# Patient Record
Sex: FEMALE | Race: WHITE | NOT HISPANIC OR LATINO | Employment: OTHER | RURAL
[De-identification: names, ages, dates, MRNs, and addresses within clinical notes are randomized per-mention and may not be internally consistent; named-entity substitution may affect disease eponyms.]

---

## 2019-04-17 ENCOUNTER — OFFICE VISIT (OUTPATIENT)
Dept: OTOLARYNGOLOGY | Facility: CLINIC | Age: 84
End: 2019-04-17

## 2019-04-17 VITALS — HEIGHT: 63 IN | WEIGHT: 99 LBS | TEMPERATURE: 99.1 F | BODY MASS INDEX: 17.54 KG/M2

## 2019-04-17 DIAGNOSIS — J37.0 LARYNGITIS, CHRONIC: ICD-10-CM

## 2019-04-17 DIAGNOSIS — R49.0 VOICE HOARSENESS: Primary | ICD-10-CM

## 2019-04-17 DIAGNOSIS — J30.0 VASOMOTOR RHINITIS: ICD-10-CM

## 2019-04-17 DIAGNOSIS — R43.8 HYPOSMIA: ICD-10-CM

## 2019-04-17 PROCEDURE — 31575 DIAGNOSTIC LARYNGOSCOPY: CPT | Performed by: OTOLARYNGOLOGY

## 2019-04-17 PROCEDURE — 99203 OFFICE O/P NEW LOW 30 MIN: CPT | Performed by: OTOLARYNGOLOGY

## 2019-04-17 RX ORDER — TRAVOPROST 0.004 %
DROPS OPHTHALMIC (EYE)
Refills: 3 | COMMUNITY
Start: 2019-04-08

## 2019-04-17 RX ORDER — OXYBUTYNIN CHLORIDE 10 MG/1
TABLET, EXTENDED RELEASE ORAL DAILY
Refills: 2 | COMMUNITY
Start: 2019-01-16

## 2019-04-17 RX ORDER — FLUOROMETHOLONE 0.1 %
SUSPENSION, DROPS(FINAL DOSAGE FORM)(ML) OPHTHALMIC (EYE)
Refills: 6 | COMMUNITY
Start: 2019-03-18

## 2019-04-17 RX ORDER — MIRTAZAPINE 15 MG/1
TABLET, FILM COATED ORAL
Refills: 1 | COMMUNITY
Start: 2019-03-28

## 2019-04-17 RX ORDER — IPRATROPIUM BROMIDE 42 UG/1
2 SPRAY, METERED NASAL 3 TIMES DAILY
Qty: 15 ML | Refills: 5 | Status: SHIPPED | OUTPATIENT
Start: 2019-04-17 | End: 2019-05-29 | Stop reason: SDUPTHER

## 2019-04-17 RX ORDER — MINOXIDIL 2.5 MG/1
TABLET ORAL
Refills: 2 | COMMUNITY
Start: 2019-02-13

## 2019-04-17 RX ORDER — BRINZOLAMIDE/BRIMONIDINE TARTRATE 10; 2 MG/ML; MG/ML
SUSPENSION/ DROPS OPHTHALMIC
Refills: 3 | COMMUNITY
Start: 2019-01-11

## 2019-04-17 RX ORDER — CETIRIZINE HYDROCHLORIDE 10 MG/1
TABLET ORAL DAILY
Refills: 2 | COMMUNITY
Start: 2019-04-05 | End: 2020-06-25

## 2019-04-17 RX ORDER — GALANTAMINE HYDROBROMIDE 4 MG/1
TABLET, FILM COATED ORAL DAILY
Refills: 11 | COMMUNITY
Start: 2019-04-06

## 2019-04-17 NOTE — PATIENT INSTRUCTIONS

## 2019-04-17 NOTE — PROGRESS NOTES
Subjective   Beverly Morris is a 88 y.o. female.   Decrease sense of smell and swallowing problems  History of Present Illness     Says she is doing better but she has had decreased sense of smell and swallowing problem for a few months he has some postnasal drip has a lump station and throat she denies weight loss fever chills lumps or neck she does have decreased sense of smell she has had some hoarseness of her voice she is not choking on foods no fever says her voice is mainly raspy    The following portions of the patient's history were reviewed and updated as appropriate: allergies, current medications, past family history, past medical history, past social history, past surgical history and problem list.      Beverly Morris reports that she has never smoked. She has never used smokeless tobacco.  Patient is not a tobacco user and has not been counseled for use of tobacco products    Family History   Problem Relation Age of Onset   • Cancer Other          Current Outpatient Medications:   •  cetirizine (zyrTEC) 10 MG tablet, Take  by mouth Daily., Disp: , Rfl: 2  •  fluorometholone (FML) 0.1 % ophthalmic suspension, , Disp: , Rfl: 6  •  galantamine (RAZADYNE) 4 MG tablet, Take  by mouth Daily., Disp: , Rfl: 11  •  minoxidil (LONITEN) 2.5 MG tablet, TK 1 T PO TID, Disp: , Rfl: 2  •  mirtazapine (REMERON) 15 MG tablet, TK 1 T PO QHS, Disp: , Rfl: 1  •  oxybutynin XL (DITROPAN-XL) 10 MG 24 hr tablet, Take  by mouth Daily., Disp: , Rfl: 2  •  SIMBRINZA 1-0.2 % suspension, INSTILL 1 DROP IN OU BID, Disp: , Rfl: 3  •  TRAVATAN Z 0.004 % solution ophthalmic solution, PUT 1 GTT AEY ONCE D BEFORE BEDTIME, Disp: , Rfl: 3  •  ipratropium (ATROVENT) 0.06 % nasal spray, 2 sprays into the nostril(s) as directed by provider 3 (Three) Times a Day., Disp: 15 mL, Rfl: 5    Allergies   Allergen Reactions   • Ambien [Zolpidem Tartrate] Other (See Comments)     Pt. States she went crazy after taking   • Codeine Nausea And Vomiting        Past Medical History:   Diagnosis Date   • Hypertension        Past Surgical History:   Procedure Laterality Date   • KNEE ARTHROPLASTY Bilateral    • WRIST SURGERY         Review of Systems   Constitutional: Positive for appetite change.   HENT: Positive for hearing loss and sore throat.    All other systems reviewed and are negative.    Hoarse voice      Objective   Physical Exam   Constitutional: She appears well-developed.   HENT:   Head: Normocephalic.   Right Ear: External ear normal.   Left Ear: External ear normal.   Nose: Mucosal edema present.       Mouth/Throat: Oropharynx is clear and moist.   Eyes: Conjunctivae are normal.   Neck: Normal range of motion.   Pulmonary/Chest: Effort normal.   Neurological: She is alert.   Skin: Skin is warm.   Psychiatric: She has a normal mood and affect.   Voice is somewhat raspy though understandable      Procedure Note    Pre-operative Diagnosis:   Chief Complaint   Patient presents with   • Difficulty Swallowing   • Loss of taste       Post-operative Diagnosis: same    Anesthesia: topical with xylocaine and neosynephrine    Endoscopy Type:  Flexible Laryngoscopy    Procedure Details:    The patient was placed in the sitting position.  After topical anesthesia and decongestion, the 4 mm laryngoscope was passed.  The , oropharynx, hypopharynx, and larynx were all examined.  Vocal cords were examined during respiration and phonation.  The following findings were noted:    Findings:  prebularynx and PND no mass lesion    Condition:  Stable.  Patient tolerated procedure well.    Complications:  None    Assessment/Plan   Beverly was seen today for difficulty swallowing and loss of taste.    Diagnoses and all orders for this visit:    Voice hoarseness    Laryngitis, chronic    Vasomotor rhinitis    Hyposmia    Other orders  -     ipratropium (ATROVENT) 0.06 % nasal spray; 2 sprays into the nostril(s) as directed by provider 3 (Three) Times a Day.    Sure there is no  tumor mass but she does have signs of present larynx with aging and postnasal drip    Were trying to control the postnasal drip with spray explained use and side effects I told her that unfortunately sometimes loss of sense of smell and taste can be permanent especially at her age we have to recheck    will recheck in the next few weeks the meantime she will use the medications she is to call if any questions or problems   consider speech therapy if not improving

## 2019-05-29 ENCOUNTER — OFFICE VISIT (OUTPATIENT)
Dept: OTOLARYNGOLOGY | Facility: CLINIC | Age: 84
End: 2019-05-29

## 2019-05-29 VITALS
HEIGHT: 63 IN | BODY MASS INDEX: 17.36 KG/M2 | WEIGHT: 98 LBS | OXYGEN SATURATION: 94 % | HEART RATE: 86 BPM | TEMPERATURE: 98.4 F

## 2019-05-29 DIAGNOSIS — R43.2 ABSENCE OF SENSE OF TASTE: ICD-10-CM

## 2019-05-29 DIAGNOSIS — J37.0 LARYNGITIS, CHRONIC: Primary | ICD-10-CM

## 2019-05-29 DIAGNOSIS — J30.0 VASOMOTOR RHINITIS: ICD-10-CM

## 2019-05-29 DIAGNOSIS — R43.8 HYPOSMIA: ICD-10-CM

## 2019-05-29 PROCEDURE — 99213 OFFICE O/P EST LOW 20 MIN: CPT | Performed by: OTOLARYNGOLOGY

## 2019-05-29 RX ORDER — IPRATROPIUM BROMIDE 42 UG/1
2 SPRAY, METERED NASAL 3 TIMES DAILY
Qty: 15 ML | Refills: 5 | Status: SHIPPED | OUTPATIENT
Start: 2019-05-29

## 2019-05-29 NOTE — PROGRESS NOTES
Subjective   Beverly Morris is a 88 y.o. female.   Follow-up throat    History of Present Illness   Still bothered by some decreased sense of taste that she can tell taste and sweets just certain foods like bananas she has trouble distinguishing she feels like her voice is doing well her drainage is better she does not have any fever chills still has use of hearing aids and is working well she has not had any sore throat      The following portions of the patient's history were reviewed and updated as appropriate: allergies, current medications, past family history, past medical history, past social history, past surgical history and problem list.      Current Outpatient Medications:   •  cetirizine (zyrTEC) 10 MG tablet, Take  by mouth Daily., Disp: , Rfl: 2  •  fluorometholone (FML) 0.1 % ophthalmic suspension, , Disp: , Rfl: 6  •  galantamine (RAZADYNE) 4 MG tablet, Take  by mouth Daily., Disp: , Rfl: 11  •  ipratropium (ATROVENT) 0.06 % nasal spray, 2 sprays into the nostril(s) as directed by provider 3 (Three) Times a Day., Disp: 15 mL, Rfl: 5  •  minoxidil (LONITEN) 2.5 MG tablet, TK 1 T PO TID, Disp: , Rfl: 2  •  mirtazapine (REMERON) 15 MG tablet, TK 1 T PO QHS, Disp: , Rfl: 1  •  oxybutynin XL (DITROPAN-XL) 10 MG 24 hr tablet, Take  by mouth Daily., Disp: , Rfl: 2  •  SIMBRINZA 1-0.2 % suspension, INSTILL 1 DROP IN OU BID, Disp: , Rfl: 3  •  TRAVATAN Z 0.004 % solution ophthalmic solution, PUT 1 GTT AEY ONCE D BEFORE BEDTIME, Disp: , Rfl: 3    Allergies   Allergen Reactions   • Ambien [Zolpidem Tartrate] Other (See Comments)     Pt. States she went crazy after taking   • Codeine Nausea And Vomiting             Review of Systems   Constitutional: Negative for fever.   HENT: Positive for hearing loss and postnasal drip. Negative for ear discharge, ear pain, sinus pressure and sinus pain.         Sense of taste decreased with certain taste           Objective   Physical Exam   Constitutional: She appears  well-developed.   HENT:   Head: Normocephalic.   Right Ear: External ear normal.   Left Ear: External ear normal.   Nose: No mucosal edema.       Mouth/Throat: Uvula is midline, oropharynx is clear and moist and mucous membranes are normal.       Eyes: Conjunctivae are normal.   Neck: Normal range of motion.   Pulmonary/Chest: Effort normal.   Neurological: She is alert.   Skin: Skin is warm.   Psychiatric: She has a normal mood and affect.           Assessment/Plan   Beverly was seen today for follow-up.    Diagnoses and all orders for this visit:    Laryngitis, chronic    Vasomotor rhinitis    Hyposmia    Absence of sense of taste    Other orders  -     ipratropium (ATROVENT) 0.06 % nasal spray; 2 sprays into the nostril(s) as directed by provider 3 (Three) Times a Day.    Gave her refills medicine for drainage as needed    She is not interested in speech therapy feels like her voice volume is adequate  I told her that there is a difficult problem to fix sense of taste completely tickly since it all went away after severe illness.  There is no evidence of any glossitis or yeast infection told her there is some chance it may come back up to a year after infection but otherwise there is little we can do  We will see her back in 1 year unless she has a problem I gave her refills her nasal spray drainage

## 2019-05-29 NOTE — PATIENT INSTRUCTIONS

## 2019-10-24 RX ORDER — IPRATROPIUM BROMIDE 42 UG/1
SPRAY, METERED NASAL
Qty: 15 ML | Refills: 0 | OUTPATIENT
Start: 2019-10-24

## 2020-06-25 ENCOUNTER — OFFICE VISIT (OUTPATIENT)
Dept: FAMILY MEDICINE CLINIC | Facility: CLINIC | Age: 85
End: 2020-06-25

## 2020-06-25 VITALS
OXYGEN SATURATION: 96 % | HEART RATE: 85 BPM | WEIGHT: 103 LBS | DIASTOLIC BLOOD PRESSURE: 88 MMHG | HEIGHT: 63 IN | BODY MASS INDEX: 18.25 KG/M2 | TEMPERATURE: 98.5 F | SYSTOLIC BLOOD PRESSURE: 140 MMHG | RESPIRATION RATE: 20 BRPM

## 2020-06-25 DIAGNOSIS — R11.0 NAUSEA: ICD-10-CM

## 2020-06-25 DIAGNOSIS — H65.02 ACUTE SEROUS OTITIS MEDIA OF LEFT EAR, RECURRENCE NOT SPECIFIED: Primary | ICD-10-CM

## 2020-06-25 PROCEDURE — 99203 OFFICE O/P NEW LOW 30 MIN: CPT | Performed by: NURSE PRACTITIONER

## 2020-06-25 RX ORDER — ERYTHROMYCIN 5 MG/G
OINTMENT OPHTHALMIC
COMMUNITY

## 2020-06-25 RX ORDER — HYDROXYZINE PAMOATE 25 MG/1
CAPSULE ORAL
COMMUNITY
End: 2020-06-25

## 2020-06-25 RX ORDER — AMOXICILLIN 500 MG/1
500 CAPSULE ORAL 2 TIMES DAILY
Qty: 20 CAPSULE | Refills: 0 | Status: SHIPPED | OUTPATIENT
Start: 2020-06-25 | End: 2020-07-05

## 2020-06-25 RX ORDER — DONEPEZIL HYDROCHLORIDE 10 MG/1
TABLET, FILM COATED ORAL
COMMUNITY

## 2020-06-25 RX ORDER — LORATADINE 10 MG/1
10 TABLET ORAL DAILY
Qty: 30 TABLET | Refills: 0 | Status: SHIPPED | OUTPATIENT
Start: 2020-06-25

## 2020-06-25 RX ORDER — ESCITALOPRAM OXALATE 10 MG/1
TABLET ORAL
COMMUNITY

## 2020-06-25 NOTE — PROGRESS NOTES
"Balwinder Morris is a 89 y.o. female.     [facility] App[facility]    PCP: Dr. Moses    CC: \"inner ear\"    Earache    There is pain in the left ear. This is a new problem. The current episode started 1 to 4 weeks ago. The problem occurs every few hours. The problem has been waxing and waning. There has been no fever. The patient is experiencing no pain. Pertinent negatives include no abdominal pain, coughing, diarrhea, ear discharge, headaches, hearing loss, neck pain, rash, rhinorrhea, sore throat or vomiting. Treatments tried: taking B6 OTC for nausea associated with the inner ear. The treatment provided mild relief. Her past medical history is significant for hearing loss. There is no history of a chronic ear infection or a tympanostomy tube. does report previous problems with inner ear infections        The following portions of the patient's history were reviewed and updated as appropriate: allergies, current medications, past medical history, past social history, past surgical history and problem list.    Review of Systems   Constitutional: Negative.    HENT: Positive for ear pain. Negative for congestion, ear discharge, hearing loss, postnasal drip, rhinorrhea, sinus pressure, sneezing, sore throat, swollen glands and trouble swallowing.    Eyes: Positive for discharge ( chronic, not new) and redness ( chronic, not new).   Respiratory: Negative for cough, chest tightness, shortness of breath and wheezing.    Cardiovascular: Negative.    Gastrointestinal: Positive for nausea. Negative for abdominal pain, diarrhea and vomiting.   Genitourinary: Negative for difficulty urinating.   Musculoskeletal: Negative for neck pain.   Skin: Negative for rash.   Neurological: Positive for headache.     /88 (BP Location: Right arm, Patient Position: Sitting, Cuff Size: Adult)   Pulse 85   Temp 98.5 °F (36.9 °C) (Temporal)   Resp 20   Ht 160 cm (63\")   Wt 46.7 kg (103 lb)   SpO2 96%   BMI 18.25 kg/m² "     Objective   Physical Exam   Constitutional: She is oriented to person, place, and time. She appears well-developed and well-nourished. No distress.   HENT:   Head: Normocephalic and atraumatic.   Right Ear: Tympanic membrane and ear canal normal. Decreased hearing is noted.   Left Ear: Ear canal normal. Tympanic membrane is not erythematous. A middle ear effusion is present. Decreased hearing is noted.   Nose: Nose normal. No congestion. Right sinus exhibits no maxillary sinus tenderness and no frontal sinus tenderness. Left sinus exhibits no maxillary sinus tenderness and no frontal sinus tenderness.   Mouth/Throat: Uvula is midline, oropharynx is clear and moist and mucous membranes are normal.   +clear PND   Eyes: Right eye exhibits discharge ( watery). Left eye exhibits discharge ( watery). Right conjunctiva is injected ( chronic). Left conjunctiva is injected ( chronic).   Neck: Neck supple.   Cardiovascular: Normal rate and regular rhythm.   Pulmonary/Chest: Effort normal and breath sounds normal. She has no wheezes. She has no rales.   Musculoskeletal:   Using cane to ambulate   Lymphadenopathy:     She has no cervical adenopathy.   Neurological: She is alert and oriented to person, place, and time.   Nursing note and vitals reviewed.    No results found for this or any previous visit (from the past 24 hour(s)).  No Images in the past 120 days found..      Assessment/Plan   Beverly was seen today for earache.    Diagnoses and all orders for this visit:    Acute serous otitis media of left ear, recurrence not specified  -     amoxicillin (AMOXIL) 500 MG capsule; Take 1 capsule by mouth 2 (Two) Times a Day for 10 days.  -     loratadine (Claritin) 10 MG tablet; Take 1 tablet by mouth Daily. X 10 days and then as needed for fluid in ear    Nausea    Push fluids  Rest  Change positions slowly  Tylenol as needed  Rx for Amoxil, Claritin provided  Will continue B6 for nausea as it has worked well for her    See  PCP or RTC if symptoms persist/worsen  See PCP for routine f/u visit and management of chronic medical conditions      This document has been electronically signed by MORRIS Clayton on June 25, 2020 12:48,.

## 2020-06-25 NOTE — PATIENT INSTRUCTIONS
